# Patient Record
Sex: FEMALE | Race: OTHER | Employment: PART TIME | URBAN - METROPOLITAN AREA
[De-identification: names, ages, dates, MRNs, and addresses within clinical notes are randomized per-mention and may not be internally consistent; named-entity substitution may affect disease eponyms.]

---

## 2024-01-02 ENCOUNTER — APPOINTMENT (OUTPATIENT)
Dept: GENERAL RADIOLOGY | Age: 49
DRG: 419 | End: 2024-01-02

## 2024-01-02 ENCOUNTER — APPOINTMENT (OUTPATIENT)
Dept: CT IMAGING | Age: 49
DRG: 419 | End: 2024-01-02

## 2024-01-02 ENCOUNTER — ANESTHESIA EVENT (OUTPATIENT)
Dept: OPERATING ROOM | Age: 49
DRG: 419 | End: 2024-01-02

## 2024-01-02 ENCOUNTER — HOSPITAL ENCOUNTER (INPATIENT)
Age: 49
LOS: 1 days | Discharge: HOME | DRG: 419 | End: 2024-01-03
Attending: EMERGENCY MEDICINE | Admitting: SURGERY

## 2024-01-02 ENCOUNTER — ANESTHESIA (OUTPATIENT)
Dept: OPERATING ROOM | Age: 49
DRG: 419 | End: 2024-01-02

## 2024-01-02 DIAGNOSIS — E87.1 HYPONATREMIA: Primary | ICD-10-CM

## 2024-01-02 DIAGNOSIS — K81.0 ACUTE CHOLECYSTITIS: ICD-10-CM

## 2024-01-02 DIAGNOSIS — G89.18 POSTOPERATIVE PAIN: ICD-10-CM

## 2024-01-02 LAB
ABO + RH BLD: NORMAL
ALBUMIN SERPL-MCNC: 4.3 G/DL (ref 3.4–5)
ALBUMIN/GLOB SERPL: 1.2 {RATIO} (ref 1.1–2.2)
ALP SERPL-CCNC: 147 U/L (ref 40–129)
ALT SERPL-CCNC: 40 U/L (ref 10–40)
ANION GAP SERPL CALCULATED.3IONS-SCNC: 12 MMOL/L (ref 3–16)
AST SERPL-CCNC: 37 U/L (ref 15–37)
BACTERIA URNS QL MICRO: ABNORMAL /HPF
BASOPHILS # BLD: 0.1 K/UL (ref 0–0.2)
BASOPHILS NFR BLD: 0.5 %
BILIRUB SERPL-MCNC: 0.4 MG/DL (ref 0–1)
BILIRUB UR QL STRIP.AUTO: NEGATIVE
BLD GP AB SCN SERPL QL: NORMAL
BUN SERPL-MCNC: 13 MG/DL (ref 7–20)
CALCIUM SERPL-MCNC: 8.9 MG/DL (ref 8.3–10.6)
CHLORIDE SERPL-SCNC: 92 MMOL/L (ref 99–110)
CLARITY UR: CLEAR
CO2 SERPL-SCNC: 22 MMOL/L (ref 21–32)
COLOR UR: YELLOW
CREAT SERPL-MCNC: <0.5 MG/DL (ref 0.6–1.1)
DEPRECATED RDW RBC AUTO: 12.3 % (ref 12.4–15.4)
EKG ATRIAL RATE: 75 BPM
EKG DIAGNOSIS: NORMAL
EKG P AXIS: 32 DEGREES
EKG P-R INTERVAL: 148 MS
EKG Q-T INTERVAL: 386 MS
EKG QRS DURATION: 88 MS
EKG QTC CALCULATION (BAZETT): 431 MS
EKG R AXIS: 49 DEGREES
EKG T AXIS: 21 DEGREES
EKG VENTRICULAR RATE: 75 BPM
EOSINOPHIL # BLD: 0 K/UL (ref 0–0.6)
EOSINOPHIL NFR BLD: 0 %
EPI CELLS #/AREA URNS HPF: ABNORMAL /HPF (ref 0–5)
GFR SERPLBLD CREATININE-BSD FMLA CKD-EPI: >60 ML/MIN/{1.73_M2}
GLUCOSE SERPL-MCNC: 188 MG/DL (ref 70–99)
GLUCOSE UR STRIP.AUTO-MCNC: NEGATIVE MG/DL
HCG SERPL QL: NEGATIVE
HCT VFR BLD AUTO: 38.9 % (ref 36–48)
HGB BLD-MCNC: 13.4 G/DL (ref 12–16)
HGB UR QL STRIP.AUTO: ABNORMAL
KETONES UR STRIP.AUTO-MCNC: NEGATIVE MG/DL
LEUKOCYTE ESTERASE UR QL STRIP.AUTO: NEGATIVE
LIPASE SERPL-CCNC: 25 U/L (ref 13–60)
LYMPHOCYTES # BLD: 1.2 K/UL (ref 1–5.1)
LYMPHOCYTES NFR BLD: 8.2 %
MCH RBC QN AUTO: 31.2 PG (ref 26–34)
MCHC RBC AUTO-ENTMCNC: 34.4 G/DL (ref 31–36)
MCV RBC AUTO: 90.7 FL (ref 80–100)
MONOCYTES # BLD: 0.4 K/UL (ref 0–1.3)
MONOCYTES NFR BLD: 2.5 %
MUCOUS THREADS #/AREA URNS LPF: ABNORMAL /LPF
NEUTROPHILS # BLD: 13 K/UL (ref 1.7–7.7)
NEUTROPHILS NFR BLD: 88.8 %
NITRITE UR QL STRIP.AUTO: NEGATIVE
PH UR STRIP.AUTO: 5.5 [PH] (ref 5–8)
PLATELET # BLD AUTO: 273 K/UL (ref 135–450)
PMV BLD AUTO: 7.6 FL (ref 5–10.5)
POTASSIUM SERPL-SCNC: 3.8 MMOL/L (ref 3.5–5.1)
PROT SERPL-MCNC: 8 G/DL (ref 6.4–8.2)
PROT UR STRIP.AUTO-MCNC: 30 MG/DL
RBC # BLD AUTO: 4.29 M/UL (ref 4–5.2)
RBC #/AREA URNS HPF: ABNORMAL /HPF (ref 0–4)
SODIUM SERPL-SCNC: 126 MMOL/L (ref 136–145)
SP GR UR STRIP.AUTO: >=1.03 (ref 1–1.03)
TROPONIN, HIGH SENSITIVITY: <6 NG/L (ref 0–14)
UA COMPLETE W REFLEX CULTURE PNL UR: ABNORMAL
UA DIPSTICK W REFLEX MICRO PNL UR: YES
URN SPEC COLLECT METH UR: ABNORMAL
UROBILINOGEN UR STRIP-ACNC: 0.2 E.U./DL
WBC # BLD AUTO: 14.7 K/UL (ref 4–11)
WBC #/AREA URNS HPF: ABNORMAL /HPF (ref 0–5)

## 2024-01-02 PROCEDURE — 88304 TISSUE EXAM BY PATHOLOGIST: CPT

## 2024-01-02 PROCEDURE — 6360000002 HC RX W HCPCS: Performed by: SURGERY

## 2024-01-02 PROCEDURE — 71045 X-RAY EXAM CHEST 1 VIEW: CPT

## 2024-01-02 PROCEDURE — 3600000009 HC SURGERY ROBOT BASE: Performed by: SURGERY

## 2024-01-02 PROCEDURE — S2900 ROBOTIC SURGICAL SYSTEM: HCPCS | Performed by: SURGERY

## 2024-01-02 PROCEDURE — 86901 BLOOD TYPING SEROLOGIC RH(D): CPT

## 2024-01-02 PROCEDURE — 3700000001 HC ADD 15 MINUTES (ANESTHESIA): Performed by: SURGERY

## 2024-01-02 PROCEDURE — 6360000002 HC RX W HCPCS: Performed by: NURSE ANESTHETIST, CERTIFIED REGISTERED

## 2024-01-02 PROCEDURE — 7100000000 HC PACU RECOVERY - FIRST 15 MIN: Performed by: SURGERY

## 2024-01-02 PROCEDURE — 6360000004 HC RX CONTRAST MEDICATION: Performed by: EMERGENCY MEDICINE

## 2024-01-02 PROCEDURE — 85025 COMPLETE CBC W/AUTO DIFF WBC: CPT

## 2024-01-02 PROCEDURE — 93010 ELECTROCARDIOGRAM REPORT: CPT | Performed by: INTERNAL MEDICINE

## 2024-01-02 PROCEDURE — 2500000003 HC RX 250 WO HCPCS: Performed by: SURGERY

## 2024-01-02 PROCEDURE — 99285 EMERGENCY DEPT VISIT HI MDM: CPT

## 2024-01-02 PROCEDURE — 3700000000 HC ANESTHESIA ATTENDED CARE: Performed by: SURGERY

## 2024-01-02 PROCEDURE — 2580000003 HC RX 258: Performed by: NURSE ANESTHETIST, CERTIFIED REGISTERED

## 2024-01-02 PROCEDURE — 3600000019 HC SURGERY ROBOT ADDTL 15MIN: Performed by: SURGERY

## 2024-01-02 PROCEDURE — 6360000002 HC RX W HCPCS: Performed by: EMERGENCY MEDICINE

## 2024-01-02 PROCEDURE — 0FT44ZZ RESECTION OF GALLBLADDER, PERCUTANEOUS ENDOSCOPIC APPROACH: ICD-10-PCS | Performed by: SURGERY

## 2024-01-02 PROCEDURE — C1889 IMPLANT/INSERT DEVICE, NOC: HCPCS | Performed by: SURGERY

## 2024-01-02 PROCEDURE — 8E0W4CZ ROBOTIC ASSISTED PROCEDURE OF TRUNK REGION, PERCUTANEOUS ENDOSCOPIC APPROACH: ICD-10-PCS | Performed by: SURGERY

## 2024-01-02 PROCEDURE — 86850 RBC ANTIBODY SCREEN: CPT

## 2024-01-02 PROCEDURE — 74177 CT ABD & PELVIS W/CONTRAST: CPT

## 2024-01-02 PROCEDURE — 84703 CHORIONIC GONADOTROPIN ASSAY: CPT

## 2024-01-02 PROCEDURE — 2580000003 HC RX 258: Performed by: SURGERY

## 2024-01-02 PROCEDURE — 93005 ELECTROCARDIOGRAM TRACING: CPT | Performed by: EMERGENCY MEDICINE

## 2024-01-02 PROCEDURE — 6360000002 HC RX W HCPCS: Performed by: ANESTHESIOLOGY

## 2024-01-02 PROCEDURE — 84484 ASSAY OF TROPONIN QUANT: CPT

## 2024-01-02 PROCEDURE — 2580000003 HC RX 258: Performed by: EMERGENCY MEDICINE

## 2024-01-02 PROCEDURE — 2500000003 HC RX 250 WO HCPCS: Performed by: NURSE ANESTHETIST, CERTIFIED REGISTERED

## 2024-01-02 PROCEDURE — 2500000003 HC RX 250 WO HCPCS

## 2024-01-02 PROCEDURE — 99223 1ST HOSP IP/OBS HIGH 75: CPT | Performed by: SURGERY

## 2024-01-02 PROCEDURE — 81001 URINALYSIS AUTO W/SCOPE: CPT

## 2024-01-02 PROCEDURE — 7100000001 HC PACU RECOVERY - ADDTL 15 MIN: Performed by: SURGERY

## 2024-01-02 PROCEDURE — 2709999900 HC NON-CHARGEABLE SUPPLY: Performed by: SURGERY

## 2024-01-02 PROCEDURE — 1200000000 HC SEMI PRIVATE

## 2024-01-02 PROCEDURE — 83690 ASSAY OF LIPASE: CPT

## 2024-01-02 PROCEDURE — 80053 COMPREHEN METABOLIC PANEL: CPT

## 2024-01-02 PROCEDURE — 96374 THER/PROPH/DIAG INJ IV PUSH: CPT

## 2024-01-02 PROCEDURE — 96375 TX/PRO/DX INJ NEW DRUG ADDON: CPT

## 2024-01-02 PROCEDURE — 47562 LAPAROSCOPIC CHOLECYSTECTOMY: CPT | Performed by: SURGERY

## 2024-01-02 PROCEDURE — 86900 BLOOD TYPING SEROLOGIC ABO: CPT

## 2024-01-02 DEVICE — CLIP INT M L POLYMER LOK LIG HEM O LOK: Type: IMPLANTABLE DEVICE | Site: ABDOMEN | Status: FUNCTIONAL

## 2024-01-02 RX ORDER — FENTANYL CITRATE 50 UG/ML
INJECTION, SOLUTION INTRAMUSCULAR; INTRAVENOUS PRN
Status: DISCONTINUED | OUTPATIENT
Start: 2024-01-02 | End: 2024-01-02 | Stop reason: SDUPTHER

## 2024-01-02 RX ORDER — MIDAZOLAM HYDROCHLORIDE 1 MG/ML
INJECTION INTRAMUSCULAR; INTRAVENOUS PRN
Status: DISCONTINUED | OUTPATIENT
Start: 2024-01-02 | End: 2024-01-02 | Stop reason: SDUPTHER

## 2024-01-02 RX ORDER — SODIUM CHLORIDE 0.9 % (FLUSH) 0.9 %
5-40 SYRINGE (ML) INJECTION PRN
Status: DISCONTINUED | OUTPATIENT
Start: 2024-01-02 | End: 2024-01-02 | Stop reason: HOSPADM

## 2024-01-02 RX ORDER — PROPOFOL 10 MG/ML
INJECTION, EMULSION INTRAVENOUS PRN
Status: DISCONTINUED | OUTPATIENT
Start: 2024-01-02 | End: 2024-01-02 | Stop reason: SDUPTHER

## 2024-01-02 RX ORDER — DEXAMETHASONE SODIUM PHOSPHATE 4 MG/ML
INJECTION, SOLUTION INTRA-ARTICULAR; INTRALESIONAL; INTRAMUSCULAR; INTRAVENOUS; SOFT TISSUE PRN
Status: DISCONTINUED | OUTPATIENT
Start: 2024-01-02 | End: 2024-01-02 | Stop reason: SDUPTHER

## 2024-01-02 RX ORDER — MEPERIDINE HYDROCHLORIDE 50 MG/ML
12.5 INJECTION INTRAMUSCULAR; INTRAVENOUS; SUBCUTANEOUS EVERY 5 MIN PRN
Status: DISCONTINUED | OUTPATIENT
Start: 2024-01-02 | End: 2024-01-02 | Stop reason: HOSPADM

## 2024-01-02 RX ORDER — ONDANSETRON 2 MG/ML
4 INJECTION INTRAMUSCULAR; INTRAVENOUS
Status: DISCONTINUED | OUTPATIENT
Start: 2024-01-02 | End: 2024-01-02 | Stop reason: HOSPADM

## 2024-01-02 RX ORDER — SODIUM CHLORIDE 0.9 % (FLUSH) 0.9 %
5-40 SYRINGE (ML) INJECTION EVERY 12 HOURS SCHEDULED
Status: DISCONTINUED | OUTPATIENT
Start: 2024-01-02 | End: 2024-01-03 | Stop reason: HOSPADM

## 2024-01-02 RX ORDER — SODIUM CHLORIDE 9 MG/ML
INJECTION, SOLUTION INTRAVENOUS PRN
Status: DISCONTINUED | OUTPATIENT
Start: 2024-01-02 | End: 2024-01-02 | Stop reason: HOSPADM

## 2024-01-02 RX ORDER — SODIUM CHLORIDE 0.9 % (FLUSH) 0.9 %
5-40 SYRINGE (ML) INJECTION EVERY 12 HOURS SCHEDULED
Status: DISCONTINUED | OUTPATIENT
Start: 2024-01-02 | End: 2024-01-02 | Stop reason: HOSPADM

## 2024-01-02 RX ORDER — MORPHINE SULFATE 4 MG/ML
4 INJECTION, SOLUTION INTRAMUSCULAR; INTRAVENOUS ONCE
Status: COMPLETED | OUTPATIENT
Start: 2024-01-02 | End: 2024-01-02

## 2024-01-02 RX ORDER — ONDANSETRON 2 MG/ML
4 INJECTION INTRAMUSCULAR; INTRAVENOUS EVERY 6 HOURS PRN
Status: DISCONTINUED | OUTPATIENT
Start: 2024-01-02 | End: 2024-01-03 | Stop reason: HOSPADM

## 2024-01-02 RX ORDER — LABETALOL HYDROCHLORIDE 5 MG/ML
5 INJECTION, SOLUTION INTRAVENOUS EVERY 10 MIN PRN
Status: DISCONTINUED | OUTPATIENT
Start: 2024-01-02 | End: 2024-01-02 | Stop reason: HOSPADM

## 2024-01-02 RX ORDER — OXYCODONE HYDROCHLORIDE 5 MG/1
10 TABLET ORAL EVERY 4 HOURS PRN
Status: DISCONTINUED | OUTPATIENT
Start: 2024-01-02 | End: 2024-01-03 | Stop reason: HOSPADM

## 2024-01-02 RX ORDER — POTASSIUM CHLORIDE 7.45 MG/ML
10 INJECTION INTRAVENOUS PRN
Status: DISCONTINUED | OUTPATIENT
Start: 2024-01-02 | End: 2024-01-03 | Stop reason: HOSPADM

## 2024-01-02 RX ORDER — SODIUM CHLORIDE, SODIUM LACTATE, POTASSIUM CHLORIDE, AND CALCIUM CHLORIDE .6; .31; .03; .02 G/100ML; G/100ML; G/100ML; G/100ML
IRRIGANT IRRIGATION PRN
Status: DISCONTINUED | OUTPATIENT
Start: 2024-01-02 | End: 2024-01-02 | Stop reason: ALTCHOICE

## 2024-01-02 RX ORDER — PHENYLEPHRINE HCL IN 0.9% NACL 1 MG/10 ML
SYRINGE (ML) INTRAVENOUS PRN
Status: DISCONTINUED | OUTPATIENT
Start: 2024-01-02 | End: 2024-01-02 | Stop reason: SDUPTHER

## 2024-01-02 RX ORDER — BUPIVACAINE HYDROCHLORIDE AND EPINEPHRINE 5; 5 MG/ML; UG/ML
INJECTION, SOLUTION PERINEURAL PRN
Status: DISCONTINUED | OUTPATIENT
Start: 2024-01-02 | End: 2024-01-02 | Stop reason: ALTCHOICE

## 2024-01-02 RX ORDER — DIPHENHYDRAMINE HYDROCHLORIDE 50 MG/ML
12.5 INJECTION INTRAMUSCULAR; INTRAVENOUS
Status: DISCONTINUED | OUTPATIENT
Start: 2024-01-02 | End: 2024-01-02 | Stop reason: HOSPADM

## 2024-01-02 RX ORDER — POTASSIUM CHLORIDE 20 MEQ/1
40 TABLET, EXTENDED RELEASE ORAL PRN
Status: DISCONTINUED | OUTPATIENT
Start: 2024-01-02 | End: 2024-01-03 | Stop reason: HOSPADM

## 2024-01-02 RX ORDER — MORPHINE SULFATE 4 MG/ML
4 INJECTION, SOLUTION INTRAMUSCULAR; INTRAVENOUS
Status: DISCONTINUED | OUTPATIENT
Start: 2024-01-02 | End: 2024-01-03 | Stop reason: HOSPADM

## 2024-01-02 RX ORDER — INDOCYANINE GREEN AND WATER 25 MG
5 KIT INJECTION
Status: COMPLETED | OUTPATIENT
Start: 2024-01-02 | End: 2024-01-02

## 2024-01-02 RX ORDER — OXYCODONE HYDROCHLORIDE 5 MG/1
5 TABLET ORAL PRN
Status: DISCONTINUED | OUTPATIENT
Start: 2024-01-02 | End: 2024-01-02 | Stop reason: HOSPADM

## 2024-01-02 RX ORDER — ONDANSETRON 2 MG/ML
INJECTION INTRAMUSCULAR; INTRAVENOUS PRN
Status: DISCONTINUED | OUTPATIENT
Start: 2024-01-02 | End: 2024-01-02 | Stop reason: SDUPTHER

## 2024-01-02 RX ORDER — SODIUM CHLORIDE 9 MG/ML
INJECTION, SOLUTION INTRAVENOUS CONTINUOUS
Status: DISCONTINUED | OUTPATIENT
Start: 2024-01-02 | End: 2024-01-02

## 2024-01-02 RX ORDER — OXYCODONE HYDROCHLORIDE 5 MG/1
10 TABLET ORAL PRN
Status: DISCONTINUED | OUTPATIENT
Start: 2024-01-02 | End: 2024-01-02 | Stop reason: HOSPADM

## 2024-01-02 RX ORDER — ONDANSETRON 2 MG/ML
4 INJECTION INTRAMUSCULAR; INTRAVENOUS ONCE
Status: COMPLETED | OUTPATIENT
Start: 2024-01-02 | End: 2024-01-02

## 2024-01-02 RX ORDER — ONDANSETRON 4 MG/1
4 TABLET, ORALLY DISINTEGRATING ORAL EVERY 8 HOURS PRN
Status: DISCONTINUED | OUTPATIENT
Start: 2024-01-02 | End: 2024-01-03 | Stop reason: HOSPADM

## 2024-01-02 RX ORDER — FAMOTIDINE 10 MG/ML
20 INJECTION, SOLUTION INTRAVENOUS 2 TIMES DAILY
Status: DISCONTINUED | OUTPATIENT
Start: 2024-01-02 | End: 2024-01-03 | Stop reason: HOSPADM

## 2024-01-02 RX ORDER — ROCURONIUM BROMIDE 10 MG/ML
INJECTION, SOLUTION INTRAVENOUS PRN
Status: DISCONTINUED | OUTPATIENT
Start: 2024-01-02 | End: 2024-01-02 | Stop reason: SDUPTHER

## 2024-01-02 RX ORDER — SODIUM CHLORIDE 0.9 % (FLUSH) 0.9 %
5-40 SYRINGE (ML) INJECTION PRN
Status: DISCONTINUED | OUTPATIENT
Start: 2024-01-02 | End: 2024-01-03 | Stop reason: HOSPADM

## 2024-01-02 RX ORDER — SODIUM CHLORIDE 9 MG/ML
INJECTION, SOLUTION INTRAVENOUS CONTINUOUS
Status: DISCONTINUED | OUTPATIENT
Start: 2024-01-02 | End: 2024-01-03 | Stop reason: HOSPADM

## 2024-01-02 RX ORDER — ENOXAPARIN SODIUM 100 MG/ML
40 INJECTION SUBCUTANEOUS DAILY
Status: DISCONTINUED | OUTPATIENT
Start: 2024-01-02 | End: 2024-01-03 | Stop reason: HOSPADM

## 2024-01-02 RX ORDER — OXYCODONE HYDROCHLORIDE 5 MG/1
5 TABLET ORAL EVERY 4 HOURS PRN
Status: DISCONTINUED | OUTPATIENT
Start: 2024-01-02 | End: 2024-01-03 | Stop reason: HOSPADM

## 2024-01-02 RX ORDER — MORPHINE SULFATE 2 MG/ML
2 INJECTION, SOLUTION INTRAMUSCULAR; INTRAVENOUS
Status: DISCONTINUED | OUTPATIENT
Start: 2024-01-02 | End: 2024-01-03 | Stop reason: HOSPADM

## 2024-01-02 RX ORDER — SODIUM CHLORIDE 9 MG/ML
INJECTION, SOLUTION INTRAVENOUS PRN
Status: DISCONTINUED | OUTPATIENT
Start: 2024-01-02 | End: 2024-01-03 | Stop reason: HOSPADM

## 2024-01-02 RX ORDER — MAGNESIUM SULFATE IN WATER 40 MG/ML
2000 INJECTION, SOLUTION INTRAVENOUS PRN
Status: DISCONTINUED | OUTPATIENT
Start: 2024-01-02 | End: 2024-01-03 | Stop reason: HOSPADM

## 2024-01-02 RX ADMIN — METHOCARBAMOL 500 MG: 100 INJECTION INTRAMUSCULAR; INTRAVENOUS at 14:29

## 2024-01-02 RX ADMIN — ONDANSETRON 4 MG: 2 INJECTION INTRAMUSCULAR; INTRAVENOUS at 03:49

## 2024-01-02 RX ADMIN — PIPERACILLIN AND TAZOBACTAM 4500 MG: 4; .5 INJECTION, POWDER, LYOPHILIZED, FOR SOLUTION INTRAVENOUS at 05:30

## 2024-01-02 RX ADMIN — HYDROMORPHONE HYDROCHLORIDE 0.5 MG: 1 INJECTION, SOLUTION INTRAMUSCULAR; INTRAVENOUS; SUBCUTANEOUS at 15:05

## 2024-01-02 RX ADMIN — ROCURONIUM BROMIDE 20 MG: 50 INJECTION, SOLUTION INTRAVENOUS at 14:11

## 2024-01-02 RX ADMIN — IOPAMIDOL 75 ML: 755 INJECTION, SOLUTION INTRAVENOUS at 04:32

## 2024-01-02 RX ADMIN — MIDAZOLAM 2 MG: 1 INJECTION INTRAMUSCULAR; INTRAVENOUS at 13:21

## 2024-01-02 RX ADMIN — SUGAMMADEX 200 MG: 100 INJECTION, SOLUTION INTRAVENOUS at 14:45

## 2024-01-02 RX ADMIN — SODIUM CHLORIDE: 9 INJECTION, SOLUTION INTRAVENOUS at 20:29

## 2024-01-02 RX ADMIN — Medication 50 MCG: at 13:40

## 2024-01-02 RX ADMIN — INDOCYANINE GREEN AND WATER 5 MG: KIT at 13:03

## 2024-01-02 RX ADMIN — DEXAMETHASONE SODIUM PHOSPHATE 8 MG: 4 INJECTION, SOLUTION INTRAMUSCULAR; INTRAVENOUS at 13:33

## 2024-01-02 RX ADMIN — Medication 10 ML: at 20:27

## 2024-01-02 RX ADMIN — SODIUM CHLORIDE: 9 INJECTION, SOLUTION INTRAVENOUS at 06:03

## 2024-01-02 RX ADMIN — Medication 50 MCG: at 14:04

## 2024-01-02 RX ADMIN — MORPHINE SULFATE 4 MG: 4 INJECTION, SOLUTION INTRAMUSCULAR; INTRAVENOUS at 03:49

## 2024-01-02 RX ADMIN — PROPOFOL 180 MG: 10 INJECTION, EMULSION INTRAVENOUS at 13:26

## 2024-01-02 RX ADMIN — FENTANYL CITRATE 50 MCG: 50 INJECTION, SOLUTION INTRAMUSCULAR; INTRAVENOUS at 13:35

## 2024-01-02 RX ADMIN — PIPERACILLIN AND TAZOBACTAM 3375 MG: 3; .375 INJECTION, POWDER, FOR SOLUTION INTRAVENOUS at 21:35

## 2024-01-02 RX ADMIN — ENOXAPARIN SODIUM 40 MG: 100 INJECTION SUBCUTANEOUS at 08:25

## 2024-01-02 RX ADMIN — ROCURONIUM BROMIDE 50 MG: 50 INJECTION, SOLUTION INTRAVENOUS at 13:26

## 2024-01-02 RX ADMIN — ONDANSETRON 4 MG: 2 INJECTION INTRAMUSCULAR; INTRAVENOUS at 14:16

## 2024-01-02 RX ADMIN — FAMOTIDINE 20 MG: 10 INJECTION, SOLUTION INTRAVENOUS at 08:25

## 2024-01-02 RX ADMIN — PIPERACILLIN AND TAZOBACTAM 3375 MG: 3; .375 INJECTION, POWDER, FOR SOLUTION INTRAVENOUS at 13:37

## 2024-01-02 RX ADMIN — FENTANYL CITRATE 50 MCG: 50 INJECTION, SOLUTION INTRAMUSCULAR; INTRAVENOUS at 13:26

## 2024-01-02 RX ADMIN — METHOCARBAMOL 500 MG: 100 INJECTION INTRAMUSCULAR; INTRAVENOUS at 13:55

## 2024-01-02 RX ADMIN — FAMOTIDINE 20 MG: 10 INJECTION, SOLUTION INTRAVENOUS at 20:27

## 2024-01-02 ASSESSMENT — LIFESTYLE VARIABLES
HOW MANY STANDARD DRINKS CONTAINING ALCOHOL DO YOU HAVE ON A TYPICAL DAY: PATIENT DOES NOT DRINK
HOW OFTEN DO YOU HAVE A DRINK CONTAINING ALCOHOL: NEVER

## 2024-01-02 ASSESSMENT — PAIN DESCRIPTION - LOCATION
LOCATION: ABDOMEN

## 2024-01-02 ASSESSMENT — PAIN DESCRIPTION - PAIN TYPE: TYPE: ACUTE PAIN

## 2024-01-02 ASSESSMENT — PAIN - FUNCTIONAL ASSESSMENT
PAIN_FUNCTIONAL_ASSESSMENT: 0-10
PAIN_FUNCTIONAL_ASSESSMENT: 0-10

## 2024-01-02 ASSESSMENT — PAIN DESCRIPTION - ORIENTATION
ORIENTATION: MID
ORIENTATION: MID

## 2024-01-02 ASSESSMENT — PAIN SCALES - GENERAL
PAINLEVEL_OUTOF10: 10
PAINLEVEL_OUTOF10: 7
PAINLEVEL_OUTOF10: 0
PAINLEVEL_OUTOF10: 10
PAINLEVEL_OUTOF10: 0
PAINLEVEL_OUTOF10: 2

## 2024-01-02 ASSESSMENT — PAIN DESCRIPTION - DESCRIPTORS: DESCRIPTORS: ACHING

## 2024-01-02 NOTE — BRIEF OP NOTE
Brief Postoperative Note      Patient: Gem Perkins  YOB: 1975  MRN: [id number]    Date of Procedure: 1/2/2024    Pre-Op Diagnosis Codes:     * Acute cholecystitis [K81.0]    Post-Op Diagnosis: Same       Procedure(s):  ROBOTIC CHOLECYSTECTOMY    Surgeon(s):  Zenon Ponce MD    Assistant:  Surgical Assistant: Keeley Lange    Anesthesia: General    Estimated Blood Loss (mL): Minimal    Complications: None    Specimens:   ID Type Source Tests Collected by Time Destination   A : GALLBLADDER AND CONTENTS Tissue Gallbladder SURGICAL PATHOLOGY Zenon Ponce MD 1/2/2024 1349        Implants:  Implant Name Type Inv. Item Serial No.  Lot No. LRB No. Used Action   CLIP INT M L POLYMER CABRERA LIG HEM O CABRERA - [id number]  CLIP INT M L POLYMER CABRERA LIG HEM O CABRERA  [employer] [id number] N/A 5 Implanted         Drains: * No LDAs found *    Findings: as above      Electronically signed by Zenon Ponce MD on 1/2/2024 at 4:52 PM

## 2024-01-02 NOTE — PROGRESS NOTES
Report called to C3 RN. All questions answered at this time. Pt's daughter Marquise remains at the bedside. Pt and pt's daughter aware of plan. Request for transport placed. Pt to be transported to assigned room #323.

## 2024-01-02 NOTE — PROGRESS NOTES
Pt admitted into 323,  A\O.  VSS.  Pain 0/10.  Family to bedside.  Bed alarm on.  Oriented to room.  Call light in reach.

## 2024-01-02 NOTE — PROGRESS NOTES
Pt brought to PACU. Report obtained from OR RN and anesthesia. Pt placed on monitor. Vitals taken at this time are below:  Vitals:    01/02/24 1457   BP: (!) 148/77   Pulse: 93   Resp: 20   Temp: 99.6 °F (37.6 °C)   SpO2: 96%     Pt currently not rousable at this time d/t sedation. Pt's surgical site assessed; 4 trochar incision sites to abdomen are clean, dry, and intact with surgical glue.

## 2024-01-02 NOTE — PROGRESS NOTES
4 Eyes Skin Assessment     The patient is being assess for  Post-Op Surgical    I agree that 2 RN's have performed a thorough Head to Toe Skin Assessment on the patient. ALL assessment sites listed below have been assessed.       Areas assessed by both nurses: LVAB  [x]   Head, Face, and Ears   [x]   Shoulders, Back, and Chest  []   Arms, Elbows, and Hands   [x]   Coccyx, Sacrum, and IschIum  [x]   Legs, Feet, and Heels        Does the Patient have Skin Breakdown?  No         Stan Prevention initiated:  NA   Wound Care Orders initiated:  NA      WOC nurse consulted for Pressure Injury (Stage 3,4, Unstageable, DTI, NWPT, and Complex wounds), New and Established Ostomies:  NA      Nurse 1 eSignature: Electronically signed by Holli Day RN on 1/2/24 at 6:51 PM EST    **SHARE this note so that the co-signing nurse is able to place an eSignature**    Nurse 2 eSignature: {Esignature:[id number]}

## 2024-01-02 NOTE — H&P
Department of General Surgery - Adult   History and Physical      PATIENT NAME: Gem Perkins   YOB: 1975    ADMISSION DATE: 1/2/2024  2:56 AM      TODAY'S DATE: 1/2/2024    CHIEF COMPLAINT:  abd pain    Historian: patient    HISTORY OF PRESENT ILLNESS:  The patient is a 48 y.o. female  who presents with RUQ pain.  Started yesterday and associated with nausea.  No fevers or chills.  Has had intermittent lesser symptoms in the past.    Past Medical History:    History reviewed. No pertinent past medical history.    Past Surgical History:    History reviewed. No pertinent surgical history.    Medications Prior to Admission:   Prior to Admission medications    Not on File       Allergies:  Patient has no known allergies.    Social History:   Social History     Socioeconomic History    Marital status:      Spouse name: Not on file    Number of children: Not on file    Years of education: Not on file    Highest education level: Not on file   Occupational History    Not on file   Tobacco Use    Smoking status: Never    Smokeless tobacco: Never   Substance and Sexual Activity    Alcohol use: Not on file    Drug use: Not on file    Sexual activity: Not on file   Other Topics Concern    Not on file   Social History Narrative    Not on file     Social Determinants of Health     Financial Resource Strain: Not on file   Food Insecurity: Not on file   Transportation Needs: Not on file   Physical Activity: Not on file   Stress: Not on file   Social Connections: Not on file   Intimate Partner Violence: Not on file   Housing Stability: Not on file         Family History:   History reviewed. No pertinent family history.    REVIEW OF SYSTEMS:    CONSTITUTIONAL:  negative  HEENT:  Negative  RESPIRATORY:  negative  CARDIOVASCULAR:  negative  GASTROINTESTINAL:  positive for nausea and abdominal pain  GENITOURINARY:  negative  HEMATOLOGIC/LYMPHATIC:  negative  ENDOCRINE:  Negative  NEUROLOGICAL:   and plan.      Electronically signed by Zenon Ponce MD     [facility]  [id number]

## 2024-01-02 NOTE — PROGRESS NOTES
Pt now fully awake. Pt's daughter Marquise brought to the bedside and provided with updates. Pt denies complaints of pain at this time. Currently awaiting bed assignment to C3.    likely anemia of ch dis, macrocytosis  -check Vit B12and folic acid

## 2024-01-02 NOTE — PROGRESS NOTES
Patient admitted to [facility] 8 in preparation for surgery, VSS. Video  used to explain procedures, ask patient questions, and sign informed consents with Dr. Santos and Dr. Ponce. IV in R AC, NS infusing per verbal order of Dr. Santos. Belongings in labled bag. Daughter Marquise at bedside.

## 2024-01-02 NOTE — ANESTHESIA PRE PROCEDURE
Department of Anesthesiology  Preprocedure Note       Name:  Gem Perkins   Age:  48 y.o.  :  1975                                          MRN:  [id number]         Date:  2024      Surgeon: Surgeon(s):  Zenon Ponce MD    Procedure: Procedure(s):  ROBOTIC CHOLECYSTECTOMY WITH INTRAOPERATIVE CHOLANGIOGRAM, OPEN IF NEEDED    Medications prior to admission:   Prior to Admission medications    Not on File       Current medications:    Current Facility-Administered Medications   Medication Dose Route Frequency Provider Last Rate Last Admin   • sodium chloride flush 0.9 % injection 5-40 mL  5-40 mL IntraVENous 2 times per day Zenon Ponce MD       • sodium chloride flush 0.9 % injection 5-40 mL  5-40 mL IntraVENous PRN Zenon Ponce MD       • 0.9 % sodium chloride infusion   IntraVENous PRN Zenon Ponce MD       • potassium chloride (KLOR-CON M) extended release tablet 40 mEq  40 mEq Oral PRN Zenon Ponce MD        Or   • potassium bicarb-citric acid (EFFER-K) effervescent tablet 40 mEq  40 mEq Oral PRN Zenon Ponce MD        Or   • potassium chloride 10 mEq/100 mL IVPB (Peripheral Line)  10 mEq IntraVENous PRN Zenon Ponce MD       • magnesium sulfate 2000 mg in 50 mL IVPB premix  2,000 mg IntraVENous PRN Zenon Ponce MD       • ondansetron (ZOFRAN-ODT) disintegrating tablet 4 mg  4 mg Oral Q8H PRN Zenon Ponce MD        Or   • ondansetron (ZOFRAN) injection 4 mg  4 mg IntraVENous Q6H PRN Zenon Ponce MD       • enoxaparin (LOVENOX) injection 40 mg  40 mg SubCUTAneous Daily Zenon Ponce MD   40 mg at 24 0825   • 0.9 % sodium chloride infusion   IntraVENous Continuous Zenon Ponce  mL/hr at 24 0603 New Bag at 24 0603   • morphine (PF) injection 2 mg  2 mg IntraVENous Q2H PRZenon Buchanan MD        Or   • morphine sulfate (PF) injection 4 mg  4 mg IntraVENous Q2H PRN Zenon Ponce

## 2024-01-02 NOTE — ED PROVIDER NOTES
[facility]  ED    EMERGENCY DEPARTMENT ENCOUNTER        Patient Name: Gem Perkins  MRN: [id number]  Birthdate 1975  Date of evaluation: 1/2/2024  PCP: No primary care provider on file.  Note Started: 4:05 AM EST 1/2/24    CHIEF COMPLAINT       Abdominal Pain (Mid abd pain started yesterday. Pt states vomiting but has not had diarrhea )      HISTORY OF PRESENT ILLNESS: 1 or more Elements       Gem Perkins is a 48 y.o. female who presents to the emergency department for evaluation of abdominal pain.  Patient reports coming acute onset of abdominal pain yesterday.  Had multiple episodes of vomiting.  No diarrhea.  Denies having anything like this before.  Reports her abdominal pain is diffuse throughout all 4 quadrants.  Denies any fevers or chills.  Denies having dysuria, urinary urgency or frequency.  Denies having history of constipation.    No other complaints, modifying factors or associated symptoms.     History obtained by the patient unless stated otherwise as above on HPI.   No limitations unless specified as above on HPI.     Past medical history: History reviewed. No pertinent past medical history.    Past surgical history: History reviewed. No pertinent surgical history.    Home medications:   Prior to Admission medications    Not on File       Social history:   Social History     Tobacco Use    Smoking status: Never    Smokeless tobacco: Never       Family history:  History reviewed. No pertinent family history.    Allergies: No Known Allergies    PMH, Surgical Hx, FH, Social Hx reviewed by myself.   Patient's care impacted by the above conditions.    REVIEW OF SYSTEMS :      Review of Systems  10 systems reviewed, pertinent positives per HPI otherwise noted to be negative.      SCREENINGS        [place] Coma Scale  Eye Opening: Spontaneous  Best Verbal Response: Oriented  Best Motor Response: Obeys commands  [place] Coma Scale Score: 15                CORTEZ  1/2/24 0603)   morphine (PF) injection 2 mg (has no administration in time range)     Or   morphine sulfate (PF) injection 4 mg (has no administration in time range)   famotidine (PEPCID) injection 20 mg (has no administration in time range)   piperacillin-tazobactam (ZOSYN) 3,375 mg in sodium chloride 0.9 % 50 mL IVPB (mini-bag) (has no administration in time range)   ondansetron (ZOFRAN) injection 4 mg (4 mg IntraVENous Given 1/2/24 0349)   morphine sulfate (PF) injection 4 mg (4 mg IntraVENous Given 1/2/24 0349)   iopamidol (ISOVUE-370) 76 % injection 75 mL (75 mLs IntraVENous Given 1/2/24 0432)   piperacillin-tazobactam (ZOSYN) 4,500 mg in sodium chloride 0.9 % 100 mL IVPB (mini-bag) (0 mg IntraVENous Stopped 1/2/24 0602)            I am the Primary Clinician of Record.    FINAL IMPRESSION      1. Hyponatremia    2. Acute cholecystitis          DISPOSITION/PLAN     DISPOSITION Admitted 01/02/2024 05:30:36 AM      PATIENT REFERRED TO:  No follow-up provider specified.    DISCHARGE MEDICATIONS:  Patient was given scripts for the following medications. I counseled patient how to take these medications:  New Prescriptions    No medications on file       DISCONTINUED MEDICATIONS:  Discontinued Medications    No medications on file       Pt was seen during the COVID 19 pandemic. Appropriate PPE worn by ME during patient encounters. Patient was cared for during a time with constrained hospital bed capacity with nationwide stress on resources and staffing.      (This chart was generated in part by using Dragon Dictation system and may contain errors related to that system including errors in grammar, punctuation, and spelling, as well as words and phrases that may be inappropriate. If there are any questions or concerns please feel free to contact the dictating provider for clarification.)          Amber Doss MD  01/02/24 2588

## 2024-01-03 VITALS
BODY MASS INDEX: 33.43 KG/M2 | HEIGHT: 66 IN | HEART RATE: 72 BPM | RESPIRATION RATE: 18 BRPM | SYSTOLIC BLOOD PRESSURE: 121 MMHG | DIASTOLIC BLOOD PRESSURE: 77 MMHG | OXYGEN SATURATION: 96 % | TEMPERATURE: 98.7 F | WEIGHT: 208 LBS

## 2024-01-03 PROCEDURE — 99024 POSTOP FOLLOW-UP VISIT: CPT | Performed by: SURGERY

## 2024-01-03 PROCEDURE — 6360000002 HC RX W HCPCS: Performed by: SURGERY

## 2024-01-03 PROCEDURE — 2580000003 HC RX 258: Performed by: SURGERY

## 2024-01-03 PROCEDURE — 2500000003 HC RX 250 WO HCPCS: Performed by: SURGERY

## 2024-01-03 RX ORDER — OXYCODONE HYDROCHLORIDE 5 MG/1
5 TABLET ORAL EVERY 6 HOURS PRN
Qty: 12 TABLET | Refills: 0 | Status: SHIPPED | OUTPATIENT
Start: 2024-01-03 | End: 2024-01-06

## 2024-01-03 RX ORDER — DOCUSATE SODIUM 100 MG/1
100 CAPSULE, LIQUID FILLED ORAL DAILY
Qty: 20 CAPSULE | Refills: 0 | Status: SHIPPED | OUTPATIENT
Start: 2024-01-03

## 2024-01-03 RX ADMIN — PIPERACILLIN AND TAZOBACTAM 3375 MG: 3; .375 INJECTION, POWDER, FOR SOLUTION INTRAVENOUS at 05:38

## 2024-01-03 RX ADMIN — PIPERACILLIN AND TAZOBACTAM 3375 MG: 3; .375 INJECTION, POWDER, FOR SOLUTION INTRAVENOUS at 13:20

## 2024-01-03 RX ADMIN — ENOXAPARIN SODIUM 40 MG: 100 INJECTION SUBCUTANEOUS at 09:34

## 2024-01-03 RX ADMIN — Medication 10 ML: at 09:34

## 2024-01-03 RX ADMIN — SODIUM CHLORIDE: 9 INJECTION, SOLUTION INTRAVENOUS at 09:46

## 2024-01-03 RX ADMIN — FAMOTIDINE 20 MG: 10 INJECTION, SOLUTION INTRAVENOUS at 09:32

## 2024-01-03 ASSESSMENT — PAIN SCALES - GENERAL
PAINLEVEL_OUTOF10: 0

## 2024-01-03 NOTE — OP NOTE
Date of Surgery: 1/2/24    Preop Dx: acute cholecystitis    Postop Dx: same    Procedure: Robotic Cholecystectomy    Surgeon: Rosario     Assistant:     Anesthesia: GETA     EBL: <50ml    Specimen: gallbladder    Complications: none    Drains/Lines: none    Indications: 49 yo with acute cholecystitis    Description:  Through an interpretor the patient was given adequate description of the risks and rewards of the procedure, including bleeding, infection, injury to surrounding structures such as the common bile duct, need for further surgery, and possibility of open procedure and freely consented. She was given appropriate antibiotics and brought to the OR where general anesthesia was induced. She was placed in supine position. Prepped and draped in usual sterile fashion.     Area above the umbilicus on the left side injected with local anesthetic and #11 blade used to incise epidermis. This allowed passage of 5mm optiview trocar using zero degree laparoscope. Once this was inserted the abdomen was insufflated to 15 mmHg pressure with CO2. Thirty degree laparoscope inserted. Abdomen inspected and significant inflammation of gallbladder seen.  The left lateral trocar was placed with an 8mm trocar under direct visualization. Another 8mm trocar and a 5mm trocars was inserted in the patient's right side under direct visualization.  The supraumbilical trocar was then substituted for a 12mm trocar.  Patient was placed in slight reverse trendelenburg position. The robot was then brought into position and connected to the appropriate trocars. The camera was inserted as well as initial instruments. I then went to the robot console. Omental adhesions were taken down.  The gallbladder was inflamed and aspirated of dark bile so it could be retracted.  Large stone was noted near the neck.  Gallbladder then grasped and retracted over dome of liver. Also grasped at infundibulum and retracted anterolaterally. Cystic duct then

## 2024-01-03 NOTE — PROGRESS NOTES
Awake on bed alert and oriented. Noted 4 lap incision sites surgically glued on abdomen. Denies pain. IV on right AC. Family at bedside. Side rails up x 2. Call light within reach. Instructed to call for assistance. Will continue to monitor

## 2024-01-03 NOTE — CARE COORDINATION
Chart review completed.  Patient a 48 year old female, admitted for Acute cholecystis.  Hospital day # 1.  Patient currently admitted on C3.  Current medical plans for patient are IV ATB.  Discharge plans at present are home with family supports, care clinic info provided on SUSY for follow up. Please advise as needs arise.  .KIM Pulido

## 2024-01-03 NOTE — PROGRESS NOTES
Pt alert and orientated. Call light within reach. No complaints of pain or discomfort at this time. Family is at bedside.Destini Sethi RN

## 2024-01-03 NOTE — PROGRESS NOTES
[facility] GENERAL SURGERY    Surgery Progress Note           POD # 1    PATIENT NAME: Gem Perkins     TODAY'S DATE: 1/3/2024    INTERVAL HISTORY:    Pt feelign better.     OBJECTIVE:   VITALS:  /75   Pulse 83   Temp 98.7 °F (37.1 °C) (Oral)   Resp 16   Ht 1.676 m (5' 6\")   Wt 94.3 kg (208 lb)   SpO2 95%   BMI 33.57 kg/m²     INTAKE/OUTPUT:    I/O last 3 completed shifts:  In: 950 [I.V.:900; IV Piggyback:50]  Out: 100 [Blood:100]  I/O this shift:  In: 1164 [I.V.:1164]  Out: -               CONSTITUTIONAL:  awake and alert  ABDOMEN:   normal bowel sounds, soft, non-distended, appropriately tender   INCISION: no drainage    Data:  CBC:   Recent Labs     01/02/24  0342   WBC 14.7*   HGB 13.4   HCT 38.9        BMP:    Recent Labs     01/02/24 0342   *   K 3.8   CL 92*   CO2 22   BUN 13   CREATININE <0.5*   GLUCOSE 188*     Hepatic:   Recent Labs     01/02/24  0342   AST 37   ALT 40   BILITOT 0.4   ALKPHOS 147*     Mag:    No results for input(s): \"MG\" in the last 72 hours.   Phos:   No results for input(s): \"PHOS\" in the last 72 hours.   INR: No results for input(s): \"INR\" in the last 72 hours.      Radiology Review:  NA    ASSESSMENT AND PLAN:  48 y.o. female status post robotic tamie  Tolerating diet and pain controlled  Plan discharge home today         Electronically signed by Zenon Ponce MD

## 2024-01-03 NOTE — PLAN OF CARE
Problem: Discharge Planning  Goal: Discharge to home or other facility with appropriate resources  1/3/2024 1551 by Destini Sethi, RN  Outcome: Completed  1/3/2024 1424 by Destini Sethi, RN  Outcome: Progressing     Problem: Pain  Goal: Verbalizes/displays adequate comfort level or baseline comfort level  1/3/2024 1551 by Destini Sethi, RN  Outcome: Completed  1/3/2024 1424 by Destini Sethi, RN  Outcome: Progressing     Problem: Safety - Adult  Goal: Free from fall injury  1/3/2024 1551 by Destini Sethi, RN  Outcome: Completed  1/3/2024 1424 by Destini Sethi, RN  Outcome: Progressing

## 2024-01-03 NOTE — DISCHARGE INSTRUCTIONS
[facility]    Theron Da Silva M.D.   [facility] Office      [facility] Office        [facility]               [address]  Dioni Ponce M.D.              [address]          [place] [address]         [place] [address]  [address] M.D                         [phone]        [facility]                   Beto Candelario M.D.          [facility]       POST-OPERATIVE INSTRUCTIONS FOR GALLBLADDER SURGERY    Call the office to schedule your post-operative appointment with your surgeon for two (2) weeks.     You will have either white steri-strips and a water occlusive dressing or surgical glue closing your incisions.    If you have clear bandages over your incisions, you may remove them in 3-4 days.  Leave the steri-stips in place. These will peel away in 7-10 days.     You may shower.  Wash incisions gently, and pat them dry. Do not rub your incisions.    General guidelines for activity:   Avoid strenuous activity or lifting anything heavier than 20 pounds.    It is OK to be up  walking around, and walking up and down stairs.     Do what is comfortable: stop and rest when you feel tired.   Drink plenty of fluids and stay on a bland diet for 2-3 days after surgery.     Do NOT drive while taking your narcotic pain medicine.     Watch for signs of infection:  Excessive warmth or bright redness around your incisions  Leakage cloudy fluid from you incisions  Fever over 101.5  During the laparoscopic procedure that you had, gas is pumped into the abdominal cavity.  You may feel abdominal, shoulder, or rib pain for a few days due to this gas.    You will have pain medicine ordered. Take as directed    If you experience constipation:  Increase your water intake  Increase your activity, walking is best.  A stool softener or mild laxative

## 2024-01-03 NOTE — DISCHARGE INSTR - COC
Continuity of Care Form    Patient Name: Gem Perkins   :  1975  MRN:  [id number]    Admit date:  2024  Discharge date:  ***    Code Status Order: Full Code   Advance Directives:   Advance Care Flowsheet Documentation       Date/Time Healthcare Directive Type of Healthcare Directive Copy in Chart Healthcare Agent Appointed Healthcare Agent's Name Healthcare Agent's Phone Number    24 1237 No, patient does not have an advance directive for healthcare treatment -- -- -- -- --            Admitting Physician:  Zenon Ponce MD  PCP: No primary care provider on file.    Discharging Nurse: ***  Discharging Hospital Unit/Room#: 0323/0323-01  Discharging Unit Phone Number: ***    Emergency Contact:   Extended Emergency Contact Information  Primary Emergency Contact: Fermin Will  [place] Phone: [phone]  Relation: Spouse    Past Surgical History:  Past Surgical History:   Procedure Laterality Date    CHOLECYSTECTOMY, LAPAROSCOPIC N/A 2024    ROBOTIC CHOLECYSTECTOMY performed by Zenon Ponce MD at [facility] OR       Immunization History:   Immunization History   Administered Date(s) Administered    COVID-19, PFIZER PURPLE top, DILUTE for use, (age 12 y+), 30mcg/0.3mL 2021, 2021       Active Problems:  Patient Active Problem List   Diagnosis Code    Acute cholecystitis K81.0       Isolation/Infection:   Isolation            No Isolation          Patient Infection Status       None to display            Nurse Assessment:  Last Vital Signs: /71   Pulse 77   Temp 97.7 °F (36.5 °C) (Oral)   Resp 18   Ht 1.676 m (5' 6\")   Wt 94.3 kg (208 lb)   SpO2 96%   BMI 33.57 kg/m²     Last documented pain score (0-10 scale): Pain Level: 0  Last Weight:   Wt Readings from Last 1 Encounters:   24 94.3 kg (208 lb)     Mental Status:  {IP PT MENTAL STATUS:}    IV Access:  {[facility] IV ACCESS:[id number]}    Nursing Mobility/ADLs:  Walking   {[facility] DME

## 2024-01-04 NOTE — DISCHARGE SUMMARY
Surgery Discharge Summary    Patient Identification  Gem Perkins is a 48 y.o. female.  :  1975  Admit Date:  2024    Discharge date:   1/3/2024  4:00 PM                                   Disposition: home    Discharge Diagnoses:   Principal Problem:    Acute cholecystitis  Resolved Problems:    * No resolved hospital problems. *      Discharge condition: good    Discharge Medications:     Discharge Medication List as of 1/3/2024  2:27 PM             Discharge Medication List as of 1/3/2024  2:27 PM        START taking these medications    Details   oxyCODONE (ROXICODONE) 5 MG immediate release tablet Take 1 tablet by mouth every 6 hours as needed for Pain for up to 3 days. Intended supply: 3 days. Take lowest dose possible to manage pain Max Daily Amount: 20 mg, Disp-12 tablet, R-0Normal      docusate sodium (COLACE) 100 MG capsule Take 1 capsule by mouth daily, Disp-20 capsule, R-0Normal               Most Recent Labs:    CBC:   Recent Labs     24  0342   WBC 14.7*   HGB 13.4   HCT 38.9        BMP:    Recent Labs     24  0342   *   K 3.8   CL 92*   CO2 22   BUN 13   CREATININE <0.5*   GLUCOSE 188*     Hepatic:   Recent Labs     24  0342   AST 37   ALT 40   BILITOT 0.4   ALKPHOS 147*     PT/INR:  No results for input(s): \"INR\" in the last 72 hours.      Consults: none    Surgery: robotic cholecystectomy    Patient Instructions:   Activity: no heavy lifting, pushing, pulling for 1 weeks, no driving for 1 weeks or while on analgesics  Diet:  low fat  Follow-up with Rosario in 2 weeks.    The patient and/or family/patient representatives, were provided education regarding discharge instructions, ongoing treatment and follow-up. Details of information given to the patient may be found in the discharge instructions located in the EMR.       HPI and Hospital Course:   Patient admitted and underwent above operaiton.  Postop once tolerating diet and pain ok was

## 2024-01-15 ENCOUNTER — OFFICE VISIT (OUTPATIENT)
Dept: SURGERY | Age: 49
End: 2024-01-15

## 2024-01-15 VITALS
HEIGHT: 66 IN | BODY MASS INDEX: 32.75 KG/M2 | DIASTOLIC BLOOD PRESSURE: 74 MMHG | WEIGHT: 203.8 LBS | SYSTOLIC BLOOD PRESSURE: 110 MMHG

## 2024-01-15 DIAGNOSIS — Z90.49 S/P LAPAROSCOPIC CHOLECYSTECTOMY: Primary | ICD-10-CM

## 2024-01-15 PROCEDURE — 99024 POSTOP FOLLOW-UP VISIT: CPT | Performed by: SURGERY

## 2024-01-15 NOTE — PROGRESS NOTES
HPI: Nursing notes reviewed.  Patient is a 47 yo who underwent robotic cholecystectomy at [facility].   Reports minimal pain and no nausea.  Energy is normal.  resolving diarrhea and no constipation.    ROS:  10 point review of systems performed with pertinent positives in HPI    Phys:    Abd - soft, nondistended, miimal tender   Incisions - no erythema    Assesment: 47 yo s/p robotic cholecystectomy     Plan: 1.  Doing well postop with minimal pain and no nausea   2.  No activity limitations   3.  Low fat diet   4.  Call with concerns

## (undated) DEVICE — SUTURE MCRYL + SZ 4-0 L18IN ABSRB UD L19MM PS-2 3/8 CIR MCP496G

## (undated) DEVICE — TIP COVER ACCESSORY

## (undated) DEVICE — MONOPOLAR CURVED SCISSORS: Brand: ENDOWRIST

## (undated) DEVICE — SUTURE VCRL + SZ 0 L27IN ABSRB VLT L26MM UR-6 5/8 CIR VCP603H

## (undated) DEVICE — AIRSEAL BIFURCATED SMOKE EVAC FILTERED TUBE SET: Brand: AIRSEAL

## (undated) DEVICE — C-ARM: Brand: UNBRANDED

## (undated) DEVICE — MEDIUM-LARGE CLIP APPLIER: Brand: ENDOWRIST

## (undated) DEVICE — ARM DRAPE

## (undated) DEVICE — TISSUE RETRIEVAL SYSTEM: Brand: INZII RETRIEVAL SYSTEM

## (undated) DEVICE — CATHETER CHOLGM 4.5FR L18IN W/ MTL SUPP TB

## (undated) DEVICE — Device

## (undated) DEVICE — PUMP SUC IRR TBNG L10FT W/ HNDPC ASSEMB STRYKEFLOW 2

## (undated) DEVICE — FENESTRATED BIPOLAR FORCEPS: Brand: ENDOWRIST

## (undated) DEVICE — SCISSORS SURG DIA8MM MPLR CRV ENDOWRIST

## (undated) DEVICE — BLADELESS OBTURATOR: Brand: WECK VISTA

## (undated) DEVICE — MARYLAND BIPOLAR FORCEPS: Brand: ENDOWRIST

## (undated) DEVICE — COLUMN DRAPE

## (undated) DEVICE — SOLUTION IRRIG 1000ML STRL H2O USP PLAS POUR BTL

## (undated) DEVICE — STOPCOCK 3-WAY INTRAVENOUS

## (undated) DEVICE — SOLUTION IV 1000ML 0.9% SOD CHL PH 5 INJ USP VIAFLX PLAS

## (undated) DEVICE — TROCAR: Brand: KII FIOS FIRST ENTRY

## (undated) DEVICE — LIQUIBAND RAPID ADHESIVE 36/CS 0.8ML: Brand: MEDLINE

## (undated) DEVICE — CANNULA SEAL

## (undated) DEVICE — REDUCER: Brand: ENDOWRIST

## (undated) DEVICE — SUTURE VCRL + SZ 3-0 L18IN ABSRB UD SH 1/2 CIR TAPERCUT NDL VCP864D

## (undated) DEVICE — SEAL

## (undated) DEVICE — GLOVE,SURG,SENSICARE SLT,LF,PF,7: Brand: MEDLINE